# Patient Record
Sex: FEMALE | Race: WHITE | NOT HISPANIC OR LATINO | URBAN - METROPOLITAN AREA
[De-identification: names, ages, dates, MRNs, and addresses within clinical notes are randomized per-mention and may not be internally consistent; named-entity substitution may affect disease eponyms.]

---

## 2020-02-07 ENCOUNTER — IMPORTED ENCOUNTER (OUTPATIENT)
Dept: URBAN - METROPOLITAN AREA CLINIC 1 | Facility: CLINIC | Age: 55
End: 2020-02-07

## 2020-02-07 PROBLEM — H52.03: Noted: 2020-02-07

## 2020-02-07 PROBLEM — H52.4: Noted: 2020-02-07

## 2020-02-07 PROCEDURE — S0620 ROUTINE OPHTHALMOLOGICAL EXA: HCPCS

## 2020-02-07 NOTE — PATIENT DISCUSSION
1.  Hyperopia: Rx was given for corrective spectacles if indicated. 2.  Presbyopia: 3. H/o DM II- pt will return for dilated exam with in 6 months 4. Cataracts OU- ObserveReturn for an appointment in 6 months 27 with Dr. Jayden Slaughter.

## 2022-04-03 ASSESSMENT — VISUAL ACUITY
OD_SC: 20/20
OS_CC: J1+
OS_SC: 20/20-1
OD_CC: J1+
OS_SC: 20/20

## 2022-04-03 ASSESSMENT — TONOMETRY
OD_IOP_MMHG: 14
OS_IOP_MMHG: 15

## 2022-05-31 ENCOUNTER — ESTABLISHED PATIENT (OUTPATIENT)
Dept: URBAN - METROPOLITAN AREA CLINIC 1 | Facility: CLINIC | Age: 57
End: 2022-05-31

## 2022-05-31 DIAGNOSIS — H52.4: ICD-10-CM

## 2022-05-31 DIAGNOSIS — H52.03: ICD-10-CM

## 2022-05-31 PROCEDURE — 99214 OFFICE O/P EST MOD 30 MIN: CPT

## 2022-05-31 ASSESSMENT — TONOMETRY
OD_IOP_MMHG: 16
OS_IOP_MMHG: 16

## 2022-05-31 ASSESSMENT — VISUAL ACUITY
OD_CC: J1
OS_CC: J1
OS_CC: 20/25
OD_CC: 20/20-1